# Patient Record
Sex: FEMALE
[De-identification: names, ages, dates, MRNs, and addresses within clinical notes are randomized per-mention and may not be internally consistent; named-entity substitution may affect disease eponyms.]

---

## 2019-08-14 ENCOUNTER — HOSPITAL ENCOUNTER (OUTPATIENT)
Dept: HOSPITAL 92 - BICULT | Age: 24
Discharge: HOME | End: 2019-08-14
Attending: INTERNAL MEDICINE
Payer: COMMERCIAL

## 2019-08-14 DIAGNOSIS — E06.3: Primary | ICD-10-CM

## 2019-08-14 PROCEDURE — 76536 US EXAM OF HEAD AND NECK: CPT

## 2019-08-14 NOTE — ULT
Exam: Thyroid ultrasound



HISTORY: Hashimoto's



COMPARISON: none



TECHNIQUE: Sagittal and transverse imaging of the thyroid gland is



FINDINGS:

Homogeneous echotexture. No solid or cystic masses.



Thyroid isthmus measures 0.2 cm

Right thyroid lobe measures 5.3 x 1.6 x 1.6 cm.

Left thyroid lobe measures 4.9 x 1.6 x 1.6 cm.



IMPRESSION: Unremarkable thyroid ultrasound.



Reported By: Shell Shields 

Electronically Signed:  8/14/2019 2:30 PM

## 2019-10-09 ENCOUNTER — HOSPITAL ENCOUNTER (EMERGENCY)
Dept: HOSPITAL 92 - ERS | Age: 24
Discharge: HOME | End: 2019-10-09
Payer: COMMERCIAL
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
## 2019-10-09 DIAGNOSIS — M25.561: Primary | ICD-10-CM

## 2019-10-09 NOTE — RAD
XR Knee Rt 4 View STANDARD



History: Knee pain



Comparison: None.



Findings: No significant joint effusion. No acute fracture or malalignment. Soft tissues are unremark
able.



Impression: No acute osseous abnormality



Reported By: Sarthak Regan 

Electronically Signed:  10/9/2019 8:59 PM